# Patient Record
Sex: MALE | Race: WHITE | NOT HISPANIC OR LATINO | URBAN - METROPOLITAN AREA
[De-identification: names, ages, dates, MRNs, and addresses within clinical notes are randomized per-mention and may not be internally consistent; named-entity substitution may affect disease eponyms.]

---

## 2018-03-20 ENCOUNTER — COMMUNICATION - HEALTHEAST (OUTPATIENT)
Dept: NEUROSURGERY | Facility: CLINIC | Age: 53
End: 2018-03-20

## 2018-03-21 ENCOUNTER — AMBULATORY - HEALTHEAST (OUTPATIENT)
Dept: NEUROSURGERY | Facility: CLINIC | Age: 53
End: 2018-03-21

## 2018-03-21 DIAGNOSIS — M54.2 NECK PAIN: ICD-10-CM

## 2018-03-29 ENCOUNTER — HOSPITAL ENCOUNTER (OUTPATIENT)
Dept: MRI IMAGING | Facility: CLINIC | Age: 53
Discharge: HOME | End: 2018-03-29
Attending: NEUROLOGICAL SURGERY

## 2018-03-29 DIAGNOSIS — M54.2 NECK PAIN: ICD-10-CM

## 2018-04-03 ENCOUNTER — OFFICE VISIT - HEALTHEAST (OUTPATIENT)
Dept: NEUROSURGERY | Facility: CLINIC | Age: 53
End: 2018-04-03

## 2018-04-03 DIAGNOSIS — M54.12 CERVICAL RADICULOPATHY: ICD-10-CM

## 2018-04-03 DIAGNOSIS — G95.20 CERVICAL SPINAL CORD COMPRESSION (H): ICD-10-CM

## 2018-04-03 RX ORDER — SUMATRIPTAN 50 MG/1
50 TABLET, FILM COATED ORAL
Status: SHIPPED | COMMUNITY
Start: 2018-04-03

## 2018-04-03 RX ORDER — DIPHENHYDRAMINE HCL 25 MG
25 CAPSULE ORAL
Status: SHIPPED | COMMUNITY
Start: 2018-04-03

## 2018-04-05 ENCOUNTER — OFFICE VISIT - HEALTHEAST (OUTPATIENT)
Dept: PHYSICAL THERAPY | Facility: REHABILITATION | Age: 53
End: 2018-04-05

## 2018-04-05 DIAGNOSIS — R29.898 WEAKNESS OF BOTH ARMS: ICD-10-CM

## 2018-04-05 DIAGNOSIS — R60.0 LOCALIZED EDEMA: ICD-10-CM

## 2018-04-05 DIAGNOSIS — M48.02 CERVICAL SPINAL STENOSIS: ICD-10-CM

## 2018-04-05 DIAGNOSIS — M54.2 CERVICALGIA: ICD-10-CM

## 2018-04-05 DIAGNOSIS — M54.12 CERVICAL RADICULOPATHY: ICD-10-CM

## 2018-04-05 DIAGNOSIS — G95.20 CERVICAL SPINAL CORD COMPRESSION (H): ICD-10-CM

## 2018-04-05 DIAGNOSIS — M25.539 ARTHRALGIA OF FOREARM, UNSPECIFIED LATERALITY: ICD-10-CM

## 2018-04-05 DIAGNOSIS — M54.12 CERVICAL RADICULITIS: ICD-10-CM

## 2018-04-05 DIAGNOSIS — Z98.890 S/P LAMINECTOMY: ICD-10-CM

## 2018-04-17 ENCOUNTER — OFFICE VISIT - HEALTHEAST (OUTPATIENT)
Dept: PHYSICAL THERAPY | Facility: REHABILITATION | Age: 53
End: 2018-04-17

## 2018-04-17 DIAGNOSIS — M25.539 ARTHRALGIA OF FOREARM, UNSPECIFIED LATERALITY: ICD-10-CM

## 2018-04-17 DIAGNOSIS — R29.898 WEAKNESS OF BOTH ARMS: ICD-10-CM

## 2018-04-17 DIAGNOSIS — M54.2 CERVICALGIA: ICD-10-CM

## 2018-04-17 DIAGNOSIS — M48.02 CERVICAL SPINAL STENOSIS: ICD-10-CM

## 2018-04-17 DIAGNOSIS — G95.20 CERVICAL SPINAL CORD COMPRESSION (H): ICD-10-CM

## 2018-04-17 DIAGNOSIS — R60.0 LOCALIZED EDEMA: ICD-10-CM

## 2018-04-17 DIAGNOSIS — M54.12 CERVICAL RADICULOPATHY: ICD-10-CM

## 2018-04-17 DIAGNOSIS — Z98.890 S/P LAMINECTOMY: ICD-10-CM

## 2018-04-17 DIAGNOSIS — M54.12 CERVICAL RADICULITIS: ICD-10-CM

## 2018-04-26 ENCOUNTER — HOSPITAL ENCOUNTER (OUTPATIENT)
Dept: PHYSICAL MEDICINE AND REHAB | Facility: CLINIC | Age: 53
Discharge: HOME | End: 2018-04-26
Attending: PHYSICIAN ASSISTANT

## 2018-04-26 DIAGNOSIS — M54.12 CERVICAL RADICULITIS: ICD-10-CM

## 2018-04-26 DIAGNOSIS — Z98.1 HISTORY OF FUSION OF CERVICAL SPINE: ICD-10-CM

## 2018-04-26 DIAGNOSIS — M48.02 CERVICAL STENOSIS OF SPINE: ICD-10-CM

## 2018-04-27 ENCOUNTER — OFFICE VISIT - HEALTHEAST (OUTPATIENT)
Dept: PHYSICAL THERAPY | Facility: REHABILITATION | Age: 53
End: 2018-04-27

## 2018-04-27 DIAGNOSIS — M54.12 CERVICAL RADICULITIS: ICD-10-CM

## 2018-04-27 DIAGNOSIS — R29.898 WEAKNESS OF BOTH ARMS: ICD-10-CM

## 2018-04-27 DIAGNOSIS — M54.12 CERVICAL RADICULOPATHY: ICD-10-CM

## 2018-04-27 DIAGNOSIS — M25.539 ARTHRALGIA OF FOREARM, UNSPECIFIED LATERALITY: ICD-10-CM

## 2018-04-27 DIAGNOSIS — M54.2 CERVICALGIA: ICD-10-CM

## 2018-04-27 DIAGNOSIS — R60.0 LOCALIZED EDEMA: ICD-10-CM

## 2018-04-27 DIAGNOSIS — G95.20 CERVICAL SPINAL CORD COMPRESSION (H): ICD-10-CM

## 2018-04-27 DIAGNOSIS — Z98.890 S/P LAMINECTOMY: ICD-10-CM

## 2018-04-27 DIAGNOSIS — M48.02 CERVICAL SPINAL STENOSIS: ICD-10-CM

## 2018-05-02 ENCOUNTER — RECORDS - HEALTHEAST (OUTPATIENT)
Dept: ADMINISTRATIVE | Facility: OTHER | Age: 53
End: 2018-05-02

## 2018-05-17 ENCOUNTER — HOSPITAL ENCOUNTER (OUTPATIENT)
Dept: CARDIOLOGY | Facility: CLINIC | Age: 53
Discharge: HOME | End: 2018-05-17

## 2018-05-17 DIAGNOSIS — Z02.89 PHYSICAL EXAMINATION OF EMPLOYEE: ICD-10-CM

## 2018-05-17 LAB
CV STRESS CURRENT BP HE: NORMAL
CV STRESS CURRENT HR HE: 101
CV STRESS CURRENT HR HE: 101
CV STRESS CURRENT HR HE: 110
CV STRESS CURRENT HR HE: 118
CV STRESS CURRENT HR HE: 118
CV STRESS CURRENT HR HE: 119
CV STRESS CURRENT HR HE: 123
CV STRESS CURRENT HR HE: 126
CV STRESS CURRENT HR HE: 128
CV STRESS CURRENT HR HE: 130
CV STRESS CURRENT HR HE: 131
CV STRESS CURRENT HR HE: 132
CV STRESS CURRENT HR HE: 139
CV STRESS CURRENT HR HE: 142
CV STRESS CURRENT HR HE: 143
CV STRESS CURRENT HR HE: 144
CV STRESS CURRENT HR HE: 144
CV STRESS CURRENT HR HE: 86
CV STRESS CURRENT HR HE: 88
CV STRESS CURRENT HR HE: 90
CV STRESS CURRENT HR HE: 90
CV STRESS CURRENT HR HE: 91
CV STRESS CURRENT HR HE: 91
CV STRESS CURRENT HR HE: 97
CV STRESS CURRENT HR HE: 98
CV STRESS CURRENT HR HE: 99
CV STRESS DEVIATION TIME HE: NORMAL
CV STRESS ECHO PERCENT HR HE: NORMAL
CV STRESS EXERCISE STAGE HE: NORMAL
CV STRESS EXERCISE STAGE REACHED HE: NORMAL
CV STRESS FINAL RESTING BP HE: NORMAL
CV STRESS FINAL RESTING HR HE: 88
CV STRESS MAX HR HE: 145
CV STRESS MAX TREADMILL GRADE HE: 16
CV STRESS MAX TREADMILL SPEED HE: 4.2
CV STRESS PEAK DIA BP HE: NORMAL
CV STRESS PEAK SYS BP HE: NORMAL
CV STRESS PHASE HE: NORMAL
CV STRESS PROTOCOL HE: NORMAL
CV STRESS RESTING PT POSITION HE: NORMAL
CV STRESS RESTING PT POSITION HE: NORMAL
CV STRESS ST DEVIATION AMOUNT HE: NORMAL
CV STRESS ST DEVIATION ELEVATION HE: NORMAL
CV STRESS ST EVELATION AMOUNT HE: NORMAL
CV STRESS TEST TYPE HE: NORMAL
CV STRESS TOTAL STAGE TIME MIN 1 HE: NORMAL
STRESS ECHO BASELINE BP: NORMAL
STRESS ECHO BASELINE HR: 91
STRESS ECHO CALCULATED PERCENT HR: 87 %
STRESS ECHO LAST STRESS BP: NORMAL
STRESS ECHO LAST STRESS HR: 144
STRESS ECHO POST ESTIMATED WORKLOAD: 10.5
STRESS ECHO POST EXERCISE DUR MIN: 9
STRESS ECHO POST EXERCISE DUR SEC: 0
STRESS ECHO TARGET HR: 145.29

## 2021-05-26 ENCOUNTER — RECORDS - HEALTHEAST (OUTPATIENT)
Dept: ADMINISTRATIVE | Facility: CLINIC | Age: 56
End: 2021-05-26

## 2021-06-01 VITALS — BODY MASS INDEX: 35.74 KG/M2 | WEIGHT: 242 LBS

## 2021-06-17 NOTE — PROGRESS NOTES
CHART NOTE     DATE OF SERVICE:  4/3/2018     : 1965   53 y.o.     ASSESSMENT :   Recurrent neck and RUE pain.  Imaging c/w R C5 foraminal stenosis.      PLAN: OPS consult And PT.  Medrol dose pasquale. TFESI if indicated, defer to OPS.     HPI:  Jr Arriaga is status post C3-7 Laminoplasty on 2015.  Patient presented with neck and UE issues.  Also had a R CTR on 10/25/2015.    Patient calling with c/o increasing neck pain. MRI ordered.     TODAY, Jr Arriaga reports has had persistent neck pain and headaches since surgery.  RUE shoulder and arm pain started 9-12 months ago.  Pain is a dull ache that starts in the R shoulder and travels down to forearm, does not go to the hand. No N/T down arm.  Does feel that his arms in general are weaker.   He did complete PT after his laminoplasty did not go to hand therapy after CTR.  Has not yet tried any conservative therapy for his current complaint.      PAST MEDICAL HISTORY, SURGICAL HISTORY, REVIEW OF SYMPTOMS, MEDICATIONS AND ALLERGIES:  Past medical history, surgical history, ROS, medications and allergies reviewed with patient and remain unchanged from previous visit.    Past Medical History:   Diagnosis Date     Benign neoplasm of colon      Brachial neuritis or radiculitis      Cervical radiculopathy 9/10/2015     Cervical stenosis of spine      Chronic pain      Displacement of lumbar intervertebral disc      Neck pain        PHYSICAL EXAM:    /81  Pulse 85  Temp 97.8  F (36.6  C)  Resp 16  SpO2 95%    Neurological exam reveals:  Respirations easy, non-labored.   Skin: W/D/I. No rashes, lesions or breaks in integrity.   Recent and remote memory intact, fund of knowledge wnl.    Alert and oriented x3, speech fluent and appropriate.   PERRL, EOMI, No nystagmus,   Face symmetric, tongue midline, Uvula midline,  palate rises with phonation   Shoulder shrug equal  Arm strength bilateral grasp, biceps, triceps, and deltoids 5/5   Leg strength  bilateral dorsiflexion, plantar flexion, and hip flexion 5/5  No extremity edema noted.   Can heel/toe walk, do toe rises and squats without difficulty.   Muscle Bulk and tone wnl.   Reflexes: No pathological reflexes , DTR 2+ LUE depressed on RUE  Gait and station:Normal, can tandem gait  Incision: CDI without erythema or edema  NDI/JONATHAN: 30%     RADIOGRAPHIC IMAGING: MRI:  Laminoplasty changes C3-C7,  no spinal canal stenosis at these levels. Severe right foraminal stenosis at C3-C4 and C4-C5.  Moderate to severe left foraminal stenosis at C4-C5.  Spinal canal stenosis related to a posterior disc abnormality at C7-T1.        Films personally reviewed and interpreted.    Reviewed imaging with patient and family.

## 2021-06-17 NOTE — PROGRESS NOTES
[facility]   Cervical Thoracic Initial Evaluation    Patient Name: Jr Arriaga  Date of evaluation: 4/5/2018  Referral Diagnosis: Cervical radiculopathy  Referring provider: Dali Greene CNP  Visit Diagnosis:     ICD-10-CM    1. Cervicalgia M54.2    2. Cervical radiculitis M54.12    3. Arthralgia of forearm, unspecified laterality M25.539    4. Localized edema R60.0    5. Cervical radiculopathy M54.12    6. Cervical spinal cord compression G95.20    7. Cervical spinal stenosis M48.02    8. S/P laminectomy Z98.890    9. Weakness of both arms R29.898        Assessment:      Pt. is appropriate for skilled PT intervention as outlined in the Plan of Care (POC).  Pt. is a good candidate for skilled PT services to improve pain levels and function.    Goals:  Pt. will demonstrate/verbalize independence in self-management of condition in : 6 weeks  Pt. will be independent with home exercise program in : 6 weeks  Pt. will decrease use of medication for pain for improved quality of life in : 12 weeks  Pt. 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    Barriers to Learning or Achieving Goals:  Chronicity of the problem.  Co-morbidities or other medical factors.  Cervical Stenosis  Cervical spinal stenosis   S/P laminectomy   Post-op pain   Hyperreflexia   Cervical radiculopathy   Weakness of both arms   Cervical spinal cord compression          Plan / Patient Instructions:        Plan of Care:   Authorization / Certification Start Date: 04/05/08  Authorization / Certification End Date: 07/03/18  Authorization  / Certification Number of Visits: 12  Communication with: Referral Source  Patient Related Instruction: Nature of Condition;Body mechanics;Posture;Treatment plan and rationale;Precautions;Next steps;Self Care instruction;Basis of treatment;Expected outcome  Times per Week: 2  Number of Weeks: 12  Number of Visits: 12  Therapeutic Exercise: ROM;Stretching;Strengthening  Neuromuscular Reeducation: kinesio tape  Manual Therapy: soft tissue mobilization;craniosacral therapy;visceral manipulation;myofascial release;muscle energy;strain counterstrain    Plan for next visit: Initiate PT per PIC     Subjective:         Social information:   Living Situation:single family home, lives with others  and stairs  with railing   Occupation:[employer]   Work Status:Working full time   Equipment Available: None    History of Present Illness:    Jr is a 53 y.o. male who presents to therapy today with complaints of Bilat Neck Pain and UE radiculopathy into the foreams and wrists.  Date of onset/duration of symptoms is 2004. Onset was sudden and related to trauma. The pt has a number od spinal surderies  To include lamnectomys to the lumbar spine and laminoplasty C3-C7, foraminotomies C3, C4 C5-6 and right carpal tunnel release in sept and Oct 2015.  Symptoms are constant and getting worse. He reports  a constant  history of similar symptoms. He describes their previous level of function as limited with Care with what Pt does on job and at home    Pain Rating:3  Pain rating at best: 2  Pain rating at worst: 6  Pain description: aching, sharp and weakness    Functional limitations are described as occurring with:   balance  looking up, looking down and turning head  sleeping  sports or recreation Limited  work Don't do the aggressive part of the job as before    Patient reports benefit from:  pain medication         Objective:      Note: Items left blank indicates the item was not performed or not indicated at the time of the  evaluation.    Patient Outcome Measures :    Neck Disability Score in %: 34   Scores range from 0-100%, where a score of 0% represents minimal pain and maximal function. The minmal clinically important difference is a score reduction of 10%.    Cervical Thoracic Examination  1. Cervicalgia     2. Cervical radiculitis     3. Arthralgia of forearm, unspecified laterality     4. Localized edema     5. Cervical radiculopathy     6. Cervical spinal cord compression     7. Cervical spinal stenosis     8. S/P laminectomy     9. Weakness of both arms       Involved side: Bilateral  Posture Observation:      General sitting posture is  normal.  General standing posture is normal.  Cervical:  Mild forward head  Shoulder/Thoracic complex: Mild bilateral scapular protraction   Lumbopelvic complex: Mildly decreased lumbar lordosis    Cervical ROM:    Date:      *Indicate scale AROM AROM AROM   Cervical Flexion 47     Cervical Extension 31      Right Left Right Left Right Left   Cervical Sidebending 26 23       Cervical Rotation 45 45       Cervical Protraction      Cervical Retraction      Thoracic Flexion      Thoracic Extension      Thoracic Sidebending         Thoracic Rotation           Palpation: Restricted Lymphatic flow T duct and R lymphatic duct at 3 sec,  Neck, Head 4 sec,  Groins 3 sec,   Thighs 6 sec       + Scan to Ant and Post Neuro cervical, Cervical  Arterials,  Ligamentous spinal      Cervical Special Tests Right Left UE Nerve Mobility Right Left   Cervical compression - - Median nerve     Cervical distraction   Ulnar nerve     Spurling s test - - Radial nerve     Shoulder abduction sign   Thoracic outlet     Deep neck flexor endurance test   Venessa     Upper cervical rotation   Adson s     Sharper-Julián   Cervical rotation lateral flexion     Alar ligament test + - Other:     Other:   Other:           Treatment Today     TREATMENT MINUTES COMMENTS   Evaluation 30    Self-care/ Home management     Manual therapy  30 MFR with OA, L5-SI and SI joint releases, Dural Tube Pull,   Sphenoid release,   Frontal, Nasal releases    Pt able to normalize his lymphatic system flow at all levels except for the cervical spinal area.  Pt encouraged to drink water to aid in detoxification.  He will be seen 1x/week due to his work schedule    Neuromuscular Re-education     Therapeutic Activity     Therapeutic Exercises     Gait training     Modality__________________                Total 60    Blank areas are intentional and mean the treatment did not include these items.           PT Evaluation Code: (Please list factors)  Patient History/Comorbidities:   Cervical spinal stenosis   S/P laminectomy   Post-op pain   Hyperreflexia   Cervical radiculopathy   Weakness of both arms   Cervical spinal cord compression       Examination  4   Neuro  Musculo-skeletal,  Lymphatic, Arterial  Clinical Presentation: evolving as the radicular symptoms are worsening  Clinical Decision Making: Moderate    Patient History/  Comorbidities Examination  (body structures and functions, activity limitations, and/or participation restrictions) Clinical Presentation Clinical Decision Making (Complexity)   No documented Comorbidities or personal factors 1-2 Elements Stable and/or uncomplicated Low   1-2 documented comorbidities or personal factor 3 Elements Evolving clinical presentation with changing characteristics Moderate   3-4 documented comorbidities or personal factors 4 or more Unstable and unpredictable High       Sergio Khoury  4/5/2018  12:11 PM

## 2021-06-17 NOTE — PROGRESS NOTES
"[facility] Daily Progress     Patient Name: Jr Arriaga  Date: 4/27/2018  Visit #: 2  PTA visit #:     Referral Diagnosis: Cervical Radiculopathy  Referring provider: Dali Greene CNP  Visit Diagnosis:     ICD-10-CM    1. Cervicalgia M54.2    2. Cervical radiculitis M54.12    3. Arthralgia of forearm, unspecified laterality M25.539    4. Localized edema R60.0          Assessment:     Patient is benefitting from skilled physical therapy and is making steady progress toward functional goals.  Patient is appropriate to continue with skilled physical therapy intervention, as indicated by initial plan of care.   He did have a slight increase in his ROM today. He will need to con't to work on his deep neck flexor strength and we will be adding more periscapular HEP next visit. .     Goal Status:  Pt. will demonstrate/verbalize independence in self-management of condition in : 6 weeks  Pt. will be independent with home exercise program in : 6 weeks  Pt. will decrease use of medication for pain for improved quality of life in : 12 weeks  Pt. 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      Subjective:     Pain Rating: 3   He is feeling the same for the most part.   He hasn't had the \"grabbing\" pain in his neck - down to 4-5 times/day vs 8-9 times/day.     Objective:     LV, MS,  fascial tensions.   C-rot: 51/46 pre treatment   54/52 post treatment    Treatment Today   " 4/27/2018   TREATMENT MINUTES COMMENTS   Evaluation     Self-care/ Home management     Manual therapy 25 Fascial release using Strain-Counterstrain of B cervical/sternal/scap-LV, B cervical ALL-MS, ATENT-N, SBF-N R   Neuromuscular Re-education 15 Recip inhib of LV, MS, neural fascia   Therapeutic Activity     Therapeutic Exercises 15 AA/PROM to C-spine, T-spine, BUE, ribs   Gait training     Modality__________________                Total 55    Blank areas are intentional and mean the treatment did not include these items.       Naman Harmon  4/27/2018

## 2021-06-17 NOTE — PROGRESS NOTES
[facility] Daily Progress     Patient Name: Jr Arriaga  Date: 2018  Visit #: 2  PTA visit #:     Referral Diagnosis: Cervical Radiculopathy  Referring provider: Dali Greene CNP  Visit Diagnosis:     ICD-10-CM    1. Cervicalgia M54.2    2. Cervical radiculitis M54.12    3. Arthralgia of forearm, unspecified laterality M25.539    4. Localized edema R60.0    5. Cervical radiculopathy M54.12    6. Cervical spinal cord compression G95.20    7. Cervical spinal stenosis M48.02    8. S/P laminectomy Z98.890    9. Weakness of both arms R29.898          Assessment:     Patient is benefitting from skilled physical therapy and is making steady progress toward functional goals.  Patient is appropriate to continue with skilled physical therapy intervention, as indicated by initial plan of care.   There was good release of fascial tensions with treatment. He did not notice much in the way of relief after treatment today. We will con't to monitor ROM.     Goal Status:  Pt. will demonstrate/verbalize independence in self-management of condition in : 6 weeks  Pt. will be independent with home exercise program in : 6 weeks  Pt. will decrease use of medication for pain for improved quality of life in : 12 weeks  Pt. 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      Subjective:     Pain Ratin-3   He feels like he didn't notice much change from the last Rx.       Objective:      LV fascial tensions.   C-rot: 52/41 post treatment  Cext: 35 post treatment    Treatment Today   4/17/2018   TREATMENT MINUTES COMMENTS   Evaluation     Self-care/ Home management     Manual therapy 25 Fascial release using Strain-Counterstrain of B cervical/sternal/scap/RUE-LV, B cervical anterior/posterior EPI-LV   Neuromuscular Re-education 15 Recip inhib of LV fascia   Therapeutic Activity     Therapeutic Exercises 15 AA/PROM to C-spine, T-spine, BUE, ribs   Gait training     Modality__________________                Total 55    Blank areas are intentional and mean the treatment did not include these items.       Naman Harmon  4/17/2018

## 2021-06-17 NOTE — PROGRESS NOTES
ASSESSMENT: Jr Arriaga is a 53 y.o. male who is otherwise healthy who presents today for new patient evaluation of a 4-6 month history of centralized neck pain with radiation into the right greater than left upper extremity with intermittent numbness and tingling.  Patient has a history of a C6-7 anterior cervical discectomy and fusion in 2006.  He is also status post a C3 through C7 laminoplasty on September 4, 2015.  MRI of cervical spine shows severe right foraminal stenosis at C3-4 and C4-5 and moderate to severe foraminal stenosis on the left at C4-5.  There are also disc protrusions from C7-T1 through T2-3.  At the C7-T1 level of the spinal canal is decompressed dorsally and there is no significant spinal canal stenosis.  There is spinal canal stenosis at T1-2 and T2-3.  On exam today, the patient is neurologically intact.      NDI:  32  Who 5: 21    PLAN:  A shared decision making model was used.  The patient's values and choices were respected.  The following represents what was discussed and decided upon by the physician assistant and the patient.      1.  DIAGNOSTIC TESTS: I reviewed the MRI cervical spine.  No further diagnostic tests ordered.    2.  PHYSICAL THERAPY: Patient is currently in physical therapy.  Encouraged him to continue doing his home exercises.    3.  MEDICATIONS: No changes are made to the patient's medications.  The patient uses tramadol 100 mg 3 times daily, tizanidine 4 mg at bedtime, naproxen at bedtime, Tylenol as needed, and cyclobenzaprine 10 mg as needed.  Patient has tried gabapentin in the past and it was not helpful.    4.  INTERVENTIONS:    -I offered the patient a C7-T1 intralaminar epidural steroid injection.  I chose this injection initially because he does have bilateral pain and multiple areas of foraminal stenosis.  The patient is going to think about this.  The patient had an intralaminar epidural steroid injection prior to his laminoplasty in which the provider  "\"hit a nerve\" and it caused increased pain.  I explained that his spinal canal is now decompressed so there is much more space to safely perform an intralaminar epidural steroid injection.  The patient voiced understanding to this.  -If the patient pursues a C7-T1 intralaminar epidural steroid injection but it fails to provide relief, we could consider performing transforaminal epidural steroid injections.  I would likely begin with a right C4-5 transforaminal epidural steroid injection as the right side is more painful than the left and the pain does extend down the right upper arm in a C5 distribution.      5.  PATIENT EDUCATION: The patient is in agreement with the above plan.  All questions were answered.    6.  FOLLOW-UP:   The patient is going to think about the injection and will let us know if he wants to proceed.  I did offer to schedule a follow-up with patient in 4-6 weeks to monitor his progress with physical therapy and potentially discuss that injection again at that time if he is not improving.  The patient declines a follow-up.  He will call our clinic if he has any questions or concerns.        SUBJECTIVE:  Jr Arriaga  Is a 53 y.o. male who presents today in consultation at the request of Dali Greene CNP for new patient evaluation of neck pain with radiation into the right greater than left upper extremity.  The patient has a history of an anterior cervical discectomy and fusion at C6-7 in 2006.  The patient states that he did well after that surgery.  In 2015 he began to experience a constant buzzing sensation in the posterior neck with occasional shooting pains.  A right C5-6 intralaminar epidural steroid injection was performed on March 27, 2015 at [facility].  The patient reports that during this procedure they \"hit a nerve\" and his pain increased.  He went on to have a C3 through C7 laminoplasty with Dr. Parrish on September 4, 2015.  The patient reports that  he did well after " that surgery until about 4-6 months ago when he began to feel some shooting pains again.  He was seen at the neurosurgery clinic.  He was referred to physical therapy and our clinic for further evaluation and treatment.    The patient complains of centralized neck pain.  With turning his neck he experiences a shooting pain in the neck.  He has more of an aching pain that radiates into the upper extremities, right greater than left involving the shoulders and upper arms to the elbows.  The patient states that the aching pain is constant on the right and only present about 30-40% of the time on the left.  He occasionally feels numbness and tingling in the same distribution as his pain in the right arm which extends into the right forearm.  Patient has a history of a right carpal tunnel release on October 25, 2015 which provided 100% relief of the numbness and tingling in the right hand.  The patient states that he is chronic left triceps weakness since 2006 when he had his ACDF.  He states that lately both arms have felt heavy and weak.  The patient's pain is worse when he first wakes up in the morning.  He has difficulty sleeping because of the pain.  His pain is aggravated lying on his right side.  He denies any alleviating factors for pain.  Patient denies any loss of bowel or bladder control.  He denies any recent fevers, chills, sweats.    The patient is currently in physical therapy.  He has had 2 sessions so far.  He does not go to chiropractor.  As mentioned above, he had an epidural steroid injection before his surgery 2015.  He has not had any more recent interventional pain management.  The patient uses tramadol 100 mg 3 times daily, tizanidine 4 mg at bedtime, naproxen at bedtime, and Tylenol as needed.  The patient states that he has cyclobenzaprine which he takes sparingly when his pain is severe at night.    Current Outpatient Prescriptions on File Prior to Encounter   Medication Sig Dispense Refill      acetaminophen (TYLENOL) 325 MG tablet Take 650 mg by mouth every 6 (six) hours as needed for pain.       cyclobenzaprine (FLEXERIL) 10 MG tablet Take 10 mg by mouth daily as needed for muscle spasms.       diphenhydrAMINE (BENADRYL) 25 mg capsule Take 25 mg by mouth at bedtime as needed for itching.       SUMAtriptan (IMITREX) 50 MG tablet Take 50 mg by mouth every 2 (two) hours as needed for migraine.       tiZANidine (ZANAFLEX) 4 MG tablet Take 4 mg by mouth at bedtime as needed.       traMADol (ULTRAM) 50 mg tablet Take  mg by mouth every 8 (eight) hours as needed.          No Known Allergies    Past Medical History:   Diagnosis Date     Benign neoplasm of colon      Brachial neuritis or radiculitis      Cervical radiculopathy 9/10/2015     Cervical stenosis of spine      Chronic pain      Displacement of lumbar intervertebral disc      Neck pain         Patient Active Problem List   Diagnosis     Cervical spinal stenosis     S/P laminectomy     Post-op pain     Hyperreflexia     Cervical radiculopathy     Weakness of both arms     Cervical spinal cord compression       Past Surgical History:   Procedure Laterality Date     ANTERIOR CERVICAL DISCECTOMY W/ FUSION       ANTERIOR CRUCIATE LIGAMENT REPAIR       LAMINECTOMY  2006, 2008    Lumbar     LAMINOPLASTY Bilateral 9/4/2015    Procedure: CERVICAL LAMINOPLASTY C3-C7 OPEN ON RIGHT, FORAMINOTOMIES BILATERAL C3, C4, C5-6;  Surgeon: Geraldine Parrish MD;  Location: [facility];  Service:      GA REVISE MEDIAN N/CARPAL TUNNEL SURG Right 10/12/2015    Procedure: RIGHT CARPAL TUNNEL RELEASE;  Surgeon: Geraldine Parrish MD;  Location: [facility];  Service: Neurosurgery       Family History   Problem Relation Age of Onset     No Medical Problems Mother      Hypertension Father      No Medical Problems Sister      No Medical Problems Brother      No Medical Problems Maternal Aunt      No Medical Problems Maternal Uncle      No  Medical Problems Paternal Aunt      No Medical Problems Paternal Uncle      No Medical Problems Maternal Grandmother      No Medical Problems Maternal Grandfather      No Medical Problems Paternal Grandfather      Social history: The patient is single.  He lives with his girlfriend.  He works as a active.  He denies tobacco, alcohol, or illicit drug.    ROS: Positive for poor sleep quality, headache.  Specifically negative for dysphagia, imbalance, fine motor skill difficulties, bowel/bladder dysfunction, fevers,chills, appetite changes, unexplained weight loss.   Otherwise 13 systems reviewed are negative.  Please see the patient's intake questionnaire from today for details.      OBJECTIVE:  PHYSICAL EXAMINATION:  CONSTITUTIONAL:  Vital signs as above.  No acute distress.  The patient is well nourished and well groomed.  PSYCHIATRIC:  The patient is awake, alert, oriented to person, place, time and answering questions appropriately with clear speech.    HEENT:  Normocephalic, atraumatic.  Sclera clear.    SKIN:  Skin over the face, bilateral upper extremities, and neck is clean, dry, intact without rashes.  LYMPH NODES:  No palpable or tender anterior/posterior cervical, submandibular, or supraclavicular lymph nodes.    MUSCLE STRENGTH:  5/5 strength for the bilateral shoulder abductors, elbow flexors/extensors, wrist extensors, finger flexors/abductors.  NEURO:  CN III-XII are grossly intact.  2/4 symmetric biceps, brachioradialis, triceps reflexes bilaterally.  Sensation to light touch is intact over bilateral upper extremities throughout.  Negative Krishna's bilaterally.    VASCULAR:  2/4 radial pulses bilaterally.  Warm upper limbs bilaterally.  Capillary refill in the upper extremities is less than 1 second.  MUSCULOSKELETAL: Cervical range of motion is mildly restricted with lateral rotation, left greater than right.  No cervical paraspinous muscle tenderness.  No tenderness palpation of the upper trapezius  muscles.  Negative Spurling sign bilaterally.  Negative Kemps test bilaterally.    RESULTS: I reviewed the MRI cervical spine from [facility] dated March 20, 2018.  Shows postoperative changes related to a posterior decompression from C3 through C7.  There is no spinal canal stenosis at these levels.  There are also postoperative changes of an anterior cervical discectomy and fusion at C6-7.  Fusion is solid.  At C7-T1 there is mild effacement of the ventral cord related to a posterior disc protrusion, however, the spinal canal is decompressed dorsally and there is no spinal canal stenosis.  The patient has foraminal stenosis which is severe on the right at C3-4 and C4-5 and moderate to severe on the left at C4-5.  There are also discogenic degenerative changes involving upper thoracic spine at T1-2 and T2-3 with posterior disc herniations resulting in spinal canal stenosis which are new/progressed from February 21, 2015.  Please see report for further details.